# Patient Record
Sex: MALE | URBAN - METROPOLITAN AREA
[De-identification: names, ages, dates, MRNs, and addresses within clinical notes are randomized per-mention and may not be internally consistent; named-entity substitution may affect disease eponyms.]

---

## 2021-03-16 ENCOUNTER — APPOINTMENT (RX ONLY)
Dept: URBAN - METROPOLITAN AREA CLINIC 56 | Facility: CLINIC | Age: 36
Setting detail: DERMATOLOGY
End: 2021-03-16

## 2021-03-16 DIAGNOSIS — L72.0 EPIDERMAL CYST: ICD-10-CM

## 2021-03-16 DIAGNOSIS — L70.0 ACNE VULGARIS: ICD-10-CM

## 2021-03-16 PROCEDURE — ? PRESCRIPTION

## 2021-03-16 PROCEDURE — ? COUNSELING

## 2021-03-16 PROCEDURE — ? PRESCRIPTION MEDICATION MANAGEMENT

## 2021-03-16 PROCEDURE — 99203 OFFICE O/P NEW LOW 30 MIN: CPT

## 2021-03-16 RX ORDER — ADAPALENE 3 MG/G
GEL TOPICAL
Qty: 1 | Refills: 10 | Status: ERX | COMMUNITY
Start: 2021-03-16

## 2021-03-16 RX ADMIN — ADAPALENE: 3 GEL TOPICAL at 00:00

## 2021-03-16 ASSESSMENT — LOCATION DETAILED DESCRIPTION DERM: LOCATION DETAILED: DISTAL DORSAL PENILE SHAFT

## 2021-03-16 ASSESSMENT — LOCATION ZONE DERM: LOCATION ZONE: PENIS

## 2021-03-16 ASSESSMENT — LOCATION SIMPLE DESCRIPTION DERM: LOCATION SIMPLE: DORSAL PENIS

## 2021-03-16 NOTE — HPI: OTHER
Condition:: Bump on penis and acne
Please Describe Your Condition:: Kvng is normally a patient of Dr. Berkowitz.  However, because he has a lesion on his penis, he preferred to see a male physician.  He states for many years he has had a growth on his penis.  It is asymptomatic and has never been treated.  He wonders what it represents and if it could be removed.\\n\\Jasmyn addition, he complains of acne of the face.  He states he has tried numerous strength of tretinoin without improvement.  He has tolerated tretinoin without difficulties.

## 2021-03-16 NOTE — PROCEDURE: PRESCRIPTION MEDICATION MANAGEMENT
Render In Strict Bullet Format?: No
Detail Level: Zone
Plan: \\n1.  Begin adapalene 0.3% gel to face every third night, increase to nightly as tolerated.  Potential side effects discussed.\\n2.  Begin Neutrogena clear pore wash to face daily massage on a few minutes before rinsing off completely.\\n3.  Non-comedogenic moisturizer such as CeraVe cream as often as needed for irritation secondary to the benzoyl peroxide wash and topical retinoid.